# Patient Record
Sex: FEMALE | Race: NATIVE HAWAIIAN OR OTHER PACIFIC ISLANDER
[De-identification: names, ages, dates, MRNs, and addresses within clinical notes are randomized per-mention and may not be internally consistent; named-entity substitution may affect disease eponyms.]

---

## 2018-06-24 ENCOUNTER — HOSPITAL ENCOUNTER (INPATIENT)
Dept: HOSPITAL 5 - TRG | Age: 38
LOS: 3 days | Discharge: HOME | End: 2018-06-27
Attending: OBSTETRICS & GYNECOLOGY | Admitting: OBSTETRICS & GYNECOLOGY
Payer: COMMERCIAL

## 2018-06-24 DIAGNOSIS — E66.9: ICD-10-CM

## 2018-06-24 DIAGNOSIS — Z3A.36: ICD-10-CM

## 2018-06-24 DIAGNOSIS — Z23: ICD-10-CM

## 2018-06-24 LAB
ALBUMIN SERPL-MCNC: 3.4 G/DL (ref 3.9–5)
ALT SERPL-CCNC: 30 UNITS/L (ref 7–56)
APTT BLD: 26.9 SEC. (ref 24.2–36.6)
BACTERIA #/AREA URNS HPF: (no result) /HPF
BASOPHILS # (AUTO): 0.1 K/MM3 (ref 0–0.1)
BASOPHILS NFR BLD AUTO: 1.4 % (ref 0–1.8)
BENZODIAZEPINES SCREEN,URINE: (no result)
BILIRUB UR QL STRIP: (no result)
BLOOD UR QL VISUAL: (no result)
BUN SERPL-MCNC: 11 MG/DL (ref 7–17)
BUN/CREAT SERPL: 28 %
CALCIUM SERPL-MCNC: 9 MG/DL (ref 8.4–10.2)
EOSINOPHIL # BLD AUTO: 0.1 K/MM3 (ref 0–0.4)
EOSINOPHIL NFR BLD AUTO: 0.8 % (ref 0–4.3)
FIBRINOGEN PPP-MCNC: 501 MG/DL (ref 211–480)
HCT VFR BLD CALC: 44 % (ref 30.3–42.9)
HEMOLYSIS INDEX: 8
HGB BLD-MCNC: 15 GM/DL (ref 10.1–14.3)
INR PPP: 0.81 (ref 0.87–1.13)
LYMPHOCYTES # BLD AUTO: 2.6 K/MM3 (ref 1.2–5.4)
LYMPHOCYTES NFR BLD AUTO: 30.2 % (ref 13.4–35)
MCH RBC QN AUTO: 29 PG (ref 28–32)
MCHC RBC AUTO-ENTMCNC: 34 % (ref 30–34)
MCV RBC AUTO: 83 FL (ref 79–97)
METHADONE SCREEN,URINE: (no result)
MONOCYTES # (AUTO): 0.4 K/MM3 (ref 0–0.8)
MONOCYTES % (AUTO): 5.2 % (ref 0–7.3)
MUCOUS THREADS #/AREA URNS HPF: (no result) /HPF
OPIATE SCREEN,URINE: (no result)
PH UR STRIP: 5 [PH] (ref 5–7)
PLATELET # BLD: 131 K/MM3 (ref 140–440)
RBC # BLD AUTO: 5.27 M/MM3 (ref 3.65–5.03)
RBC #/AREA URNS HPF: 4 /HPF (ref 0–6)
URATE SERPL-MCNC: 5.3 MG/DL (ref 3.5–7.6)
UROBILINOGEN UR-MCNC: 2 MG/DL (ref ?–2)
WBC #/AREA URNS HPF: 10 /HPF (ref 0–6)

## 2018-06-24 PROCEDURE — 86901 BLOOD TYPING SEROLOGIC RH(D): CPT

## 2018-06-24 PROCEDURE — 80053 COMPREHEN METABOLIC PANEL: CPT

## 2018-06-24 PROCEDURE — 85027 COMPLETE CBC AUTOMATED: CPT

## 2018-06-24 PROCEDURE — C9250 ARTISS FIBRIN SEALANT: HCPCS

## 2018-06-24 PROCEDURE — 85018 HEMOGLOBIN: CPT

## 2018-06-24 PROCEDURE — 86592 SYPHILIS TEST NON-TREP QUAL: CPT

## 2018-06-24 PROCEDURE — 82962 GLUCOSE BLOOD TEST: CPT

## 2018-06-24 PROCEDURE — 86850 RBC ANTIBODY SCREEN: CPT

## 2018-06-24 PROCEDURE — 85610 PROTHROMBIN TIME: CPT

## 2018-06-24 PROCEDURE — 80307 DRUG TEST PRSMV CHEM ANLYZR: CPT

## 2018-06-24 PROCEDURE — 85025 COMPLETE CBC W/AUTO DIFF WBC: CPT

## 2018-06-24 PROCEDURE — 83615 LACTATE (LD) (LDH) ENZYME: CPT

## 2018-06-24 PROCEDURE — 36415 COLL VENOUS BLD VENIPUNCTURE: CPT

## 2018-06-24 PROCEDURE — 84550 ASSAY OF BLOOD/URIC ACID: CPT

## 2018-06-24 PROCEDURE — 83735 ASSAY OF MAGNESIUM: CPT

## 2018-06-24 PROCEDURE — 85014 HEMATOCRIT: CPT

## 2018-06-24 PROCEDURE — 85730 THROMBOPLASTIN TIME PARTIAL: CPT

## 2018-06-24 PROCEDURE — 76815 OB US LIMITED FETUS(S): CPT

## 2018-06-24 PROCEDURE — 81001 URINALYSIS AUTO W/SCOPE: CPT

## 2018-06-24 PROCEDURE — 85384 FIBRINOGEN ACTIVITY: CPT

## 2018-06-24 PROCEDURE — 86900 BLOOD TYPING SEROLOGIC ABO: CPT

## 2018-06-24 RX ADMIN — KETOROLAC TROMETHAMINE PRN MG: 30 INJECTION, SOLUTION INTRAMUSCULAR at 13:02

## 2018-06-24 RX ADMIN — MORPHINE SULFATE PRN MG: 2 INJECTION, SOLUTION INTRAMUSCULAR; INTRAVENOUS at 17:49

## 2018-06-24 RX ADMIN — CEFAZOLIN SCH MLS/MIN: 10 INJECTION, POWDER, FOR SOLUTION INTRAVENOUS at 21:29

## 2018-06-24 RX ADMIN — KETOROLAC TROMETHAMINE PRN MG: 30 INJECTION, SOLUTION INTRAMUSCULAR at 21:29

## 2018-06-24 NOTE — ULTRASOUND REPORT
OB LIMITED



INDICATION: Evaluate placental status, position.  Severe abdominal pain.



COMPARISON: None similar during this gestation.



TECHNIQUE:  Transabdominal grayscale ultrasound with Doppler 

interrogation.





Gestation: Moss



Fetal Position: Cephalic



Amniotic Fluid: WNL (7-24 cm)

  MEHRDAD = 9.2 cm



Placenta: Fundal.  No evidence of abruption.  Few small cystic areas 

within the placenta may represent placental lakes.

  Placental Grade: Zero



Fetal Heart Rate:

  150 BPM





CONCLUSION: Findings, as above.

## 2018-06-24 NOTE — ANESTHESIA CONSULTATION
Anesthesia Consult and Med Hx


Date of service: 18





- Airway


Anesthetic Teeth Evaluation: Good


ROM Head & Neck: Adequate


Mental/Hyoid Distance: Adequate


Mallampati Class: Class III


Intubation Access Assessment: Probably Good





- Pulmonary Exam


CTA: Yes





- Cardiac Exam


Cardiac Exam: RRR





- Pre-Operative Health Status


ASA Pre-Surgery Classification: ASA3


Proposed Anesthetic Plan: Spinal





- Pre-Anesthesia Comment


Pre-Anesthesia Comments: 38y  presenting with concerns for Pre-Eclampsia; 

preg also complicated by poorly controlled diabetes.  Prev deliveries vaginal.





- Pulmonary


Hx Asthma: No





- Cardiovascular System


Hx Hypertension: Yes


Hx Coronary Artery Disease: No





- Central Nervous System


Hx Seizures: No


Hx Psychiatric Problems: No





- Endocrine


Hx Renal Disease: No


Hx Liver Disease: No


Hx Hypothyroidism: No


Hx Hyperthyroidism: No





- Hematic


Hx Anemia: No


Hx Sickle Cell Disease: No





- Other Systems


Hx Alcohol Use: No

## 2018-06-24 NOTE — OPERATIVE REPORT
Operative Report


Operative Report: 





Date of procedure: 2018





Pre-operative diagnosis: 1.  Intrauterine pregnancy at 36-3/7 weeks   2.  

Severe preeclampsia   3.  Uncontrolled gestational diabetes





Post-operative diagnosis: Same





Procedure name(s): Primary low transverse  section





Surgeon: Lokesh Frey MD





Assistant: None





Anesthesia: Spinal epidural anesthesia by Dr. Mueller





EBL: 500 mls





Findings: A 3780 g female infant Apgars 8 at 1 minute and 9 at 5 minutes.  

Clear amniotic fluid.  Normal uterus.  Normal tubes and ovaries bilaterally.





Procedure: After the patient was prepped and draped in usual sterile fashion, 

and after satisfactory level of epidural anesthesia was obtained, the skin 

knife was used to make a transverse skin incision.  The incision was excised 

down to layer of the fascia, which was nicked in the midline and extended 

laterally using the Bovie cautery.  The rectus muscles were dissected off the 

rectus fascia both superiorly and inferiorly.  The rectus bellies  in 

the midline, and the peritoneum was entered under direct visualization.  The 

peritoneal incision was extended superiorly and inferiorly.  A bladder flap was 

created and the bladder blade was then placed.  The uterus was scored in a 

curvilinear linear fashion, entered in the midline revealing clear amniotic 

fluid.  The infant's head was delivered onto the surgical field, nuchal cord 1 

reduced and the oropharynx and nasopharynx were bulb suctioned.  The rest of 

the infant's body was delivered, cord was doubly clamped and cut and the infant 

was handed to the waiting respiratory team.  Cord blood was obtained. The 

placenta was manually removed from the uterus, and the uterus removed from its 

normal anatomical position.  After gentle uterine lavage, the incision was 

inspected and found to be without extensions.  It was then closed in 2 layers 

using 0 Vicryl suture in a running interlocking fashion, the second layer 

imbricating the first.  After good hemostasis was achieved, copious amounts or 

irrigation was performed, and the gutters were suctioned free of blood and 

blood clots.  Tisseel sealant was sprayed across the uterine incision.  The 

uterus was then returned to its normal anatomical position, and after excellent 

hemostasis assured, the peritoneum was re-approximated using 3-0 Vicryl suture 

in a running interlocking fashion, and then the rectus muscles were re-

approximated using 3-0 Vicryl suture in a figure-of-eight configuration.  The 

fascia was then re-approximated using 0 Vicryl suture in running interlocking 

fashion.  The subcutaneous layer was made hemostatic using Bovie cautery, the 

Tisseel sealant was sprayed across the fascial incision and the skin edges re-

approximated using 4-0 Vicryl suture in a sub-cuticular fashion.  Patient 

tolerated the procedure well was transported to recovery in stable condition.

## 2018-06-24 NOTE — HISTORY AND PHYSICAL REPORT
History of Present Illness


Date of examination: 18


Date of admission: 


18 09:53





Chief complaint: 


Severe constant abdominal pain since 04:00





History of present illness: 


38 year old  presents to L&D complaining of severe abdominal pain. 

Patient states abdominal pain started at 04:00 AM and has been constant since 

then. Patient denies vaginal bleeding or leaking of fluid. Patient denies 

contractions. Patient reports fetal movement has been slightly decreased this 

AM but until this morning has been normal. Patient denies headache, visual 

disturbance, nausea or vomiting or swelling. 


Patient has received prenatal care at [facility]; she brings prenatal 

records with her. Prenatal records show last prenatal visit was in March but 

patient states she has been seen more recently (2 weeks ago).


Patient has gestational diabetes and has been taking Metformin. Patient states 

her sugars have not been well controlled. She states she has been seeing [facility]. 

Patient denies having chronic hypertension. She reports having severe 

preeclampsia with her first pregnancy which necessitated a  (32 week) 

vaginal delivery. Patient reports her second child was also born early (32 weeks

) due to PPROM. Third child was a full term normal vaginal birth. Fourth child 

was born vaginally at 40 weeks after IUFD. 


LMP 10/12/17. EDC 18. EGA 36 weeks, 3 days gestation. 


Current medications: Prenatal Vitamin, Metformin 500 mg po BID.


Medication allergies: NKDA.


Prenatal labs: O+, antibody screen negative, pap smear negative, rubella immune

, RPR nonreactive, hepatitis B surface antigen negative, HIV negative, 

chlamydia negative, gonorrhea negative, quad screen negative. 








Past History


Past Medical History: other (obesity, gestational diabetes, preeclampsia with 

her first pregnancy)


Past Surgical History: no surgical history


GYN History: denies: abnormal PAP smear, chlamydia, gonorrhea, herpes, HIV


Family/Genetic History: none


Social history: , lives with family, full code.  denies: smoking, 

alcohol abuse, prescription drug abuse, IV drug use





- Obstetrical History


Expected Date of Delivery: 18


Actual Gestation: 36 Week(s) 3 Day(s) 


: 8


Para: 3


Hx # Term Pregnancies: 2


Number of  Pregnancies: 3


Spontaneous Abortions: 3


Induced : 0


Number of Living Children: 3





Medications and Allergies


 Allergies











Allergy/AdvReac Type Severity Reaction Status Date / Time


 


No Known Allergies Allergy   Unverified 18 09:17











Active Meds: 


Active Medications





Lactated Ringer's (Lactated Ringers)  1,000 mls @ 125 mls/hr IV AS DIRECT HAYLEY


Oxytocin/Sodium Chloride (Pitocin/Ns 20 Unit/1000ml Drip)  20 units in 1,000 

mls @ 125 mls/hr IV AS DIRECT HAYLEY


Magnesium Sulfate (Magnesium Sulfate 40gm/1000ml)  40 gm in 1,000 mls @ 50 mls/

hr IV AS DIRECT HAYLEY


Labetalol HCl (Normodyne)  10 mg IV ONCE ONE


   Stop: 18 10:23


Mineral Oil (Mineral Oil)  30 ml PO QHS PRN


   PRN Reason: Constipation











Review of Systems


All systems: negative (severe abdominal pain)





- Vital Signs


Vital signs: 


 Vital Signs











Pulse Pulse Ox


 


 71   98 


 


 18 09:23  18 09:23








 











Temp Pulse Resp BP Pulse Ox


 


    69      206/99   98 


 


    18 09:35     18 09:35  18 09:33














- Physical Exam


Cardiovascular: Regular rate, Normal S1, Normal S2


Lungs: Positive: Clear to auscultation


Abdomen: Positive: normal appearance, soft, tenderness.  Negative: distention, 

guarding, rigidity


Genitourinary (Female): Positive: normal external genitalia


Cervix: Positive: other (60/-3 per RN upon arrival)


Uterus: Positive: enlarged (gravid)


Extremities: Positive: normal.  Negative: tenderness, edema





- Obstetrical


FHR: category 2 (FHR baseline 140 with moderate variability and occasional 

variable FHR deceleration)


Uterine Contraction Monitor Mode: External


Cervical Dilatation: 1


Cervical Effacement Percentage: 60


Fetal station: -3





Results


Result Diagrams: 


 18 10:15





All other labs normal.








Assessment and Plan


A: Pregnancy at 36 weeks, 3 days gestation.


Severe preeclampsia.


Uncontrolled gestational diabetes. 


GBS unknown.


P: Admit


Stat  section per Dr. Frey. 


Stat labs. 


Magnesium Sulfate.


IV Hydralazine.


IV Labetalol.

## 2018-06-24 NOTE — ANESTHESIA DAY OF SURGERY
Anesthesia Day of Surgery





- Day of Surgery


Patient Examined: Yes


Patient H&P Reviewed: Yes


Patient is NPO: No (emergent)


Beta Blockers: No


Cardiac Clearance: Yes


Pulmonary Clearance: Yes


Rah's Test: N/A

## 2018-06-25 LAB
HCT VFR BLD CALC: 36.2 % (ref 30.3–42.9)
HGB BLD-MCNC: 12.4 GM/DL (ref 10.1–14.3)

## 2018-06-25 PROCEDURE — 3E0234Z INTRODUCTION OF SERUM, TOXOID AND VACCINE INTO MUSCLE, PERCUTANEOUS APPROACH: ICD-10-PCS | Performed by: OBSTETRICS & GYNECOLOGY

## 2018-06-25 RX ADMIN — OXYCODONE AND ACETAMINOPHEN PRN TAB: 5; 325 TABLET ORAL at 11:01

## 2018-06-25 RX ADMIN — CEFAZOLIN SCH MLS/MIN: 10 INJECTION, POWDER, FOR SOLUTION INTRAVENOUS at 05:40

## 2018-06-25 RX ADMIN — FERROUS SULFATE TAB 325 MG (65 MG ELEMENTAL FE) SCH MG: 325 (65 FE) TAB at 09:59

## 2018-06-25 RX ADMIN — IBUPROFEN PRN MG: 800 TABLET, FILM COATED ORAL at 15:18

## 2018-06-25 RX ADMIN — Medication SCH EACH: at 09:59

## 2018-06-25 RX ADMIN — KETOROLAC TROMETHAMINE PRN MG: 30 INJECTION, SOLUTION INTRAMUSCULAR at 05:45

## 2018-06-25 RX ADMIN — MORPHINE SULFATE PRN MG: 2 INJECTION, SOLUTION INTRAMUSCULAR; INTRAVENOUS at 02:03

## 2018-06-25 RX ADMIN — OXYCODONE AND ACETAMINOPHEN PRN TAB: 5; 325 TABLET ORAL at 21:09

## 2018-06-25 NOTE — PROGRESS NOTE
Assessment and Plan





- Patient Problems


(1) S/P repeat low transverse 


Current Visit: Yes   Status: Acute   


Plan to address problem: 


POD 1 - stable


Continue routine postop orders


Anticipate discharge in 24-48-hrs








(2) Pre-eclampsia


Current Visit: Yes   Status: Acute   


Qualifiers: 


   Trimester: third trimester   Qualified Code(s): O14.93 - Unspecified pre-

eclampsia, third trimester   


Plan to address problem: 


BPs stable


Continue magnesium sulfate therapy


Check magnesium level


Discontinue 24 hrs post-delivery


Ambulation encouraged, as tolerated








(3) Gestational diabetes mellitus


Current Visit: Yes   Status: Acute   


Qualifiers: 


   Gestational diabetes mellitus control: insulin-controlled   Trimester: third 

trimester   Qualified Code(s): O24.414 - Gestational diabetes mellitus in 

pregnancy, insulin controlled   


Plan to address problem: 


Continue routine accuchecks


Continue sliding scale insulin








Subjective





- Subjective


Date of service: 18


Principal diagnosis: s/p Primary LTCS, POD 1


Patient reports: appetite normal, pain well controlled, other (Denies headache, 

visual disturbances or epigastric pain. Vazquez catheter in place)





Objective





- Vital Signs


Latest vital signs: 


 Vital Signs











  Temp Pulse Resp BP BP Pulse Ox


 


 18 07:38   71    109/64 


 


 18 07:35  98.6 F  62  20   106/69  94


 


 18 04:30  98.1 F  73    127/79 


 


 18 02:35   73  18   123/75  96


 


 18 00:45  97.9 F  74  18   135/80  94


 


 18 22:30   94 H  18   105/67  95


 


 18 20:15  98.4 F  89  18   115/70  96


 


 18 17:59   88  18   127/76  95


 


 18 17:49    20   


 


 18 16:40  97.9 F  87  18   143/84  95


 


 18 13:50  97.6 F  71  16   131/77  98


 


 18 13:14  97.8 F     


 


 18 13:05   75  17  129/72   98


 


 18 13:02    22   


 


 18 13:00   76  15  129/80   98


 


 18 12:55   74  18  135/81   96


 


 18 12:50   74  17  132/75   97


 


 18 12:45   75  11 L  122/72   98


 


 06/24/18 12:40   75  13  127/74   96


 


 18 12:35   73  18  126/73   96


 


 18 12:30   74  19  120/73   97


 


 18 12:25   76  19  121/70   96


 


 18 12:19   77  19  119/68   96


 


 18 12:13   79  16  111/70   96


 


 18 12:07   80  20  111/68   96


 


 18 12:01  97.6 F      96


 


 18 11:01   96 H   130/74  


 


 18 10:58   92 H   127/74  


 


 18 10:42   88   135/77  








 Intake and Output











 18





 23:59 07:59 15:59


 


Intake Total 600 240 


 


Output Total 1400 2100 


 


Balance -800 -1860 


 


Intake:   


 


  Oral 600 240 


 


Output:   


 


  Urine 1400 2100 


 


    Indwelling Catheter 1400 2100 


 


Other:   


 


  Total, Intake Amount 240 120 


 


  Total, Output Amount 800 1200 














- Exam


Cardiovascular: Present: Regular rate, Normal S1, Normal S2, No murmurs


Lungs: Present: Clear to auscultation, Normal air movement


Abdomen: Present: normal appearance, soft


Vulva: both: normal


Uterus: Present: normal, firm, fundal height at umbilicus


Incision: Present: normal, dry, dressed





- Labs


Labs: 


 Abnormal lab results











  18 Range/Units





  10:15 10:15 10:15 


 


RBC  5.27 H    (3.65-5.03)  M/mm3


 


Hgb  15.0 H    (10.1-14.3)  gm/dl


 


Hct  44.0 H    (30.3-42.9)  %


 


Plt Count  131 L    (140-440)  K/mm3


 


PT     (12.2-14.9)  Sec.


 


INR     (0.87-1.13)  


 


Fibrinogen     (211-480)  mg/dl


 


Sodium   136 L   (137-145)  mmol/L


 


Carbon Dioxide   20 L   (22-30)  mmol/L


 


Creatinine   0.4 L   (0.7-1.2)  mg/dL


 


Glucose   120 H   ()  mg/dL


 


POC Glucose     ()  


 


AST   43 H   (5-40)  units/L


 


Alkaline Phosphatase   182 H   ()  units/L


 


Lactate Dehydrogenase     ()  units/L


 


Albumin   3.4 L   (3.9-5)  g/dL


 


Urine WBC (Auto)    10.0 H  (0.0-6.0)  /HPF














  18 Range/Units





  10:15 10:15 13:45 


 


RBC     (3.65-5.03)  M/mm3


 


Hgb     (10.1-14.3)  gm/dl


 


Hct     (30.3-42.9)  %


 


Plt Count     (140-440)  K/mm3


 


PT  11.6 L    (12.2-14.9)  Sec.


 


INR  0.81 L    (0.87-1.13)  


 


Fibrinogen  501 H    (211-480)  mg/dl


 


Sodium     (137-145)  mmol/L


 


Carbon Dioxide     (22-30)  mmol/L


 


Creatinine     (0.7-1.2)  mg/dL


 


Glucose     ()  mg/dL


 


POC Glucose    141 H  ()  


 


AST     (5-40)  units/L


 


Alkaline Phosphatase     ()  units/L


 


Lactate Dehydrogenase   422 H   ()  units/L


 


Albumin     (3.9-5)  g/dL


 


Urine WBC (Auto)     (0.0-6.0)  /HPF














  18 Range/Units





  18:30 21:52 


 


RBC    (3.65-5.03)  M/mm3


 


Hgb    (10.1-14.3)  gm/dl


 


Hct    (30.3-42.9)  %


 


Plt Count    (140-440)  K/mm3


 


PT    (12.2-14.9)  Sec.


 


INR    (0.87-1.13)  


 


Fibrinogen    (211-480)  mg/dl


 


Sodium    (137-145)  mmol/L


 


Carbon Dioxide    (22-30)  mmol/L


 


Creatinine    (0.7-1.2)  mg/dL


 


Glucose    ()  mg/dL


 


POC Glucose  168 H  122 H  ()  


 


AST    (5-40)  units/L


 


Alkaline Phosphatase    ()  units/L


 


Lactate Dehydrogenase    ()  units/L


 


Albumin    (3.9-5)  g/dL


 


Urine WBC (Auto)    (0.0-6.0)  /HPF

## 2018-06-26 RX ADMIN — OXYCODONE AND ACETAMINOPHEN PRN TAB: 5; 325 TABLET ORAL at 02:10

## 2018-06-26 RX ADMIN — FERROUS SULFATE TAB 325 MG (65 MG ELEMENTAL FE) SCH MG: 325 (65 FE) TAB at 09:53

## 2018-06-26 RX ADMIN — IBUPROFEN PRN MG: 800 TABLET, FILM COATED ORAL at 05:26

## 2018-06-26 RX ADMIN — OXYCODONE AND ACETAMINOPHEN PRN TAB: 5; 325 TABLET ORAL at 17:55

## 2018-06-26 RX ADMIN — OXYCODONE AND ACETAMINOPHEN PRN TAB: 5; 325 TABLET ORAL at 09:53

## 2018-06-26 RX ADMIN — Medication SCH EACH: at 09:53

## 2018-06-26 NOTE — PROGRESS NOTE
Assessment and Plan





(1) S/P repeat low transverse 


Current Visit: Yes   Status: Acute   


Plan to address problem: 


POD 2 - stable


Continue routine postop orders


Anticipate discharge in 24-48-hrs








(2) Pre-eclampsia


Current Visit: Yes   Status: Acute   


Qualifiers: 


   Trimester: third trimester   Qualified Code(s): O14.93 - Unspecified pre-

eclampsia, third trimester   


Plan to address problem: 


BPs stable


Magnesium sulfate discontinued


Ambulation encouraged, as tolerated








(3) Gestational diabetes mellitus


Current Visit: Yes   Status: Acute   


Qualifiers: 


   Gestational diabetes mellitus control: insulin-controlled   Trimester: third 

trimester   Qualified Code(s): O24.414 - Gestational diabetes mellitus in 

pregnancy, insulin controlled   


Plan to address problem: 


Continue routine accuchecks


Continue sliding scale insulin











Subjective





- Subjective


Date of service: 18


Principal diagnosis: s/p Primary LTCS, POD 1


Patient reports: appetite normal, voiding normally, pain well controlled, flatus

, ambulating normally, no bowel movement


[place]: doing well, bottle feeding





Objective





- Vital Signs


Latest vital signs: 


 Vital Signs











  Temp Pulse Resp BP BP Pulse Ox


 


 18 05:26    18   


 


 18 02:10    18   


 


 18 23:27  98.4 F  70  16  136/67   95


 


 18 21:09    20   


 


 18 16:45  98.3 F  65  16  111/61  111/81  96


 


 18 12:00  98.2 F  70  20  118/71   93


 


 18 11:59  98.2 F  69  20   113/71  93


 


 18 10:08  98.1 F  73  20  118/65  118/65  95








 Intake and Output











 18





 23:59 07:59 15:59


 


Intake Total 480 600 


 


Output Total 600 405 


 


Balance -120 195 


 


Intake:   


 


  Oral 480 600 


 


Output:   


 


  Urine 600 405 


 


    Indwelling Catheter  5 


 


    Void 600 400 


 


Other:   


 


  Total, Intake Amount 480 600 


 


  Total, Output Amount 600 5 


 


  # Voids   


 


    Void 1  


 


  # Bowel Movements  0 














- Exam


Breasts: Present: normal


Cardiovascular: Present: Regular rate, Normal S1, Normal S2


Lungs: Present: Clear to auscultation, Normal air movement


Abdomen: Present: normal appearance, soft, tenderness (as expected), normal 

bowel sounds.  Absent: distention


Vulva: both: normal


Uterus: Present: firm, fundal height at umbilicus


Extremities: Present: normal


Deep Tendon Reflex Grade: Normal +2


Incision: Present: normal (LTI, Closed with SQ sutures, steri strips, CDI, no 

drainage), dry, intact, dressed (Pressure pressing removed)





- Labs


Labs: 


 Abnormal lab results











  18 Range/Units





  08:55 11:53 12:08 


 


POC Glucose  120 H   138 H  ()  


 


Magnesium   4.90 H   (1.7-2.3)  mg/dL














  18 Range/Units





  18:10 21:17 


 


POC Glucose  149 H  107 H  ()  


 


Magnesium    (1.7-2.3)  mg/dL

## 2018-06-26 NOTE — DISCHARGE SUMMARY
Providers





- Providers


Date of Admission: 


18 09:53





Date of discharge: 18


Attending physician: 


LAMAR WATT MD





Primary care physician: 


LAMAR WATT MD








Hospitalization


Reason for admission: active labor, IUP at term


Delivery: 


Procedure: primary low transverse


Procedure details: 





See H&P and operative note


Incision: normal (LTI closed with SQ sutures and steri strips, CDI, no 

drainage. ), dry, intact, dressed (Pressure dressing removed. )


Postpartum complications: none


Discharge diagnosis: IUP at term delivered


[place] baby: female


Condition at discharge: Good


Disposition: DC-01 TO HOME OR SELFCARE





Plan





- Discharge Medications


Prescriptions: 


Ferrous Sulfate [Feosol 325 MG tab] 325 mg PO BID #60 tablet


HYDROcodone/APAP 5-325 [[place] 5/325] 1 each PO Q6HR PRN #30 tablet


 PRN Reason: Pain


Ibuprofen [Motrin] 800 mg PO Q8HR PRN #30 tablet


 PRN Reason: Moder Pain Unrelieved By Norco


Prenatal Vit Calc,Iron,Folic [Prenatal Vitamins] 1 each PO DAILY #30 tablet





- Provider Discharge Summary


Activity: routine, no sex for 6 weeks, no heavy lifting 4 weeks, no strenuous 

exercise


Diet: routine


Instructions: routine


Additional instructions: 


[]  Smoking cessation referral if applicable(refer to patient education folder 

for contact #)


[]  Refer to [facility]'s [facility] Booklet








Call your doctor immediately for:


* Fever > 100.5


* Heavy vaginal bleeding ( >1 pad per hour)


* Severe persistent headache


* Shortness of breath


* Reddened, hot, painful area to leg or breast


* Drainage or odor from incision.





* Keep incision clean and dry at all times and follow doctor's instructions 

regarding bathing/showering











- Follow up plan


Follow up: 


LAMAR WATT MD [Primary Care Provider] - 7 Days

## 2018-06-27 VITALS — SYSTOLIC BLOOD PRESSURE: 139 MMHG | DIASTOLIC BLOOD PRESSURE: 75 MMHG

## 2018-06-27 RX ADMIN — IBUPROFEN PRN MG: 800 TABLET, FILM COATED ORAL at 05:48

## 2018-06-27 RX ADMIN — IBUPROFEN PRN MG: 800 TABLET, FILM COATED ORAL at 00:32
